# Patient Record
Sex: FEMALE | Race: WHITE | Employment: FULL TIME | URBAN - METROPOLITAN AREA
[De-identification: names, ages, dates, MRNs, and addresses within clinical notes are randomized per-mention and may not be internally consistent; named-entity substitution may affect disease eponyms.]

---

## 2020-03-04 ENCOUNTER — OFFICE VISIT (OUTPATIENT)
Dept: HEMATOLOGY/ONCOLOGY | Facility: HOSPITAL | Age: 40
End: 2020-03-04
Attending: THORACIC SURGERY (CARDIOTHORACIC VASCULAR SURGERY)
Payer: COMMERCIAL

## 2020-03-04 VITALS
DIASTOLIC BLOOD PRESSURE: 73 MMHG | OXYGEN SATURATION: 96 % | RESPIRATION RATE: 16 BRPM | SYSTOLIC BLOOD PRESSURE: 109 MMHG | WEIGHT: 175 LBS | TEMPERATURE: 98 F | HEART RATE: 77 BPM

## 2020-03-04 NOTE — CONSULTS
Thoracic Surgery Consult    Reason for Consultation: mediastinal mass    Chief Complaint: \"mass in my chest is growing\"    History of Present Illness: Patient is a 37year old, female with PMH HTN presents today for evaluation of mediastinal mass.     Kush Ayala in no acute distress  HEENT: Normocephalic  Neck: Supple, trachea midline, no JVD, no masses. Nodes: no cervical or supraclavicular lymphadenopathy appreciated  Heart: regular rate and rhythm. No murmurs, rubs or gallops. No lower extremity edema.   Lungs: of the mass. On personal review of the films, I think that this may be evolving lung consolidation. It should be reachable for biopsy via navigational bronchoscopy. I have consulted Dr. Nelia Cid of interventional pulmonology for this reason.   I w

## 2020-03-17 NOTE — H&P
[address] Patient Status:  Hospital Outpatient Surgery    1980 MRN [id number]   [facility] ENDOSCOPY Attending Navi Carlson MD   Hosp Day # 0 PCP No primary care provider on file.        Pt seen and exam

## 2020-03-25 RX ORDER — LABETALOL 100 MG/1
100 TABLET, FILM COATED ORAL 2 TIMES DAILY
COMMUNITY
End: 2021-11-04

## 2020-04-16 NOTE — PROGRESS NOTES
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 She was in the nursing home for 2 hours on 4/7 doing xrays there. She kept working til 4/11 and has been home since.  Has not been tested for

## 2020-04-16 NOTE — PAT NURSING NOTE
Spoke with patient to verify date change to 5/1/20  Patient states no changes in her medical history or medications

## 2020-04-17 ENCOUNTER — APPOINTMENT (OUTPATIENT)
Dept: CT IMAGING | Facility: HOSPITAL | Age: 40
End: 2020-04-17
Attending: INTERNAL MEDICINE
Payer: COMMERCIAL

## 2020-04-29 ENCOUNTER — TELEPHONE (OUTPATIENT)
Dept: FAMILY MEDICINE CLINIC | Facility: CLINIC | Age: 40
End: 2020-04-29

## 2020-04-29 ENCOUNTER — LAB ENCOUNTER (OUTPATIENT)
Dept: LAB | Facility: HOSPITAL | Age: 40
End: 2020-04-29
Attending: PHYSICIAN ASSISTANT
Payer: COMMERCIAL

## 2020-04-29 DIAGNOSIS — Z20.822 SUSPECTED 2019 NOVEL CORONAVIRUS INFECTION: Primary | ICD-10-CM

## 2020-04-29 DIAGNOSIS — Z20.822 SUSPECTED 2019 NOVEL CORONAVIRUS INFECTION: ICD-10-CM

## 2020-05-01 ENCOUNTER — HOSPITAL ENCOUNTER (OUTPATIENT)
Facility: HOSPITAL | Age: 40
Setting detail: HOSPITAL OUTPATIENT SURGERY
Discharge: HOME | End: 2020-05-01
Attending: INTERNAL MEDICINE | Admitting: INTERNAL MEDICINE
Payer: COMMERCIAL

## 2020-05-01 ENCOUNTER — APPOINTMENT (OUTPATIENT)
Dept: GENERAL RADIOLOGY | Facility: HOSPITAL | Age: 40
End: 2020-05-01
Attending: INTERNAL MEDICINE
Payer: COMMERCIAL

## 2020-05-01 ENCOUNTER — HOSPITAL ENCOUNTER (OUTPATIENT)
Dept: CT IMAGING | Facility: HOSPITAL | Age: 40
Discharge: HOME | End: 2020-05-01
Attending: INTERNAL MEDICINE | Admitting: INTERNAL MEDICINE
Payer: COMMERCIAL

## 2020-05-01 ENCOUNTER — ANESTHESIA (OUTPATIENT)
Dept: ENDOSCOPY | Facility: HOSPITAL | Age: 40
End: 2020-05-01
Payer: COMMERCIAL

## 2020-05-01 ENCOUNTER — ANESTHESIA EVENT (OUTPATIENT)
Dept: ENDOSCOPY | Facility: HOSPITAL | Age: 40
End: 2020-05-01
Payer: COMMERCIAL

## 2020-05-01 VITALS
DIASTOLIC BLOOD PRESSURE: 72 MMHG | BODY MASS INDEX: 27.8 KG/M2 | TEMPERATURE: 98 F | WEIGHT: 173 LBS | HEIGHT: 66 IN | OXYGEN SATURATION: 94 % | RESPIRATION RATE: 20 BRPM | HEART RATE: 88 BPM | SYSTOLIC BLOOD PRESSURE: 111 MMHG

## 2020-05-01 DIAGNOSIS — R91.8 LUNG MASS: ICD-10-CM

## 2020-05-01 DIAGNOSIS — J98.59 MEDIASTINAL MASS: Primary | ICD-10-CM

## 2020-05-01 PROCEDURE — 87205 SMEAR GRAM STAIN: CPT | Performed by: INTERNAL MEDICINE

## 2020-05-01 PROCEDURE — 0BBC8ZX EXCISION OF RIGHT UPPER LUNG LOBE, VIA NATURAL OR ARTIFICIAL OPENING ENDOSCOPIC, DIAGNOSTIC: ICD-10-PCS | Performed by: INTERNAL MEDICINE

## 2020-05-01 PROCEDURE — 0B9C8ZX DRAINAGE OF RIGHT UPPER LUNG LOBE, VIA NATURAL OR ARTIFICIAL OPENING ENDOSCOPIC, DIAGNOSTIC: ICD-10-PCS | Performed by: INTERNAL MEDICINE

## 2020-05-01 PROCEDURE — 07978ZX DRAINAGE OF THORAX LYMPHATIC, VIA NATURAL OR ARTIFICIAL OPENING ENDOSCOPIC APPROACH, DIAGNOSTIC: ICD-10-PCS | Performed by: INTERNAL MEDICINE

## 2020-05-01 PROCEDURE — 87305 ASPERGILLUS AG IA: CPT | Performed by: INTERNAL MEDICINE

## 2020-05-01 PROCEDURE — 88104 CYTOPATH FL NONGYN SMEARS: CPT | Performed by: INTERNAL MEDICINE

## 2020-05-01 PROCEDURE — 71250 CT THORAX DX C-: CPT | Performed by: INTERNAL MEDICINE

## 2020-05-01 PROCEDURE — 87102 FUNGUS ISOLATION CULTURE: CPT | Performed by: INTERNAL MEDICINE

## 2020-05-01 PROCEDURE — 88185 FLOWCYTOMETRY/TC ADD-ON: CPT | Performed by: INTERNAL MEDICINE

## 2020-05-01 PROCEDURE — 88305 TISSUE EXAM BY PATHOLOGIST: CPT | Performed by: INTERNAL MEDICINE

## 2020-05-01 PROCEDURE — 87206 SMEAR FLUORESCENT/ACID STAI: CPT | Performed by: INTERNAL MEDICINE

## 2020-05-01 PROCEDURE — 88173 CYTOPATH EVAL FNA REPORT: CPT | Performed by: INTERNAL MEDICINE

## 2020-05-01 PROCEDURE — 71045 X-RAY EXAM CHEST 1 VIEW: CPT | Performed by: INTERNAL MEDICINE

## 2020-05-01 PROCEDURE — 89051 BODY FLUID CELL COUNT: CPT | Performed by: INTERNAL MEDICINE

## 2020-05-01 PROCEDURE — 87116 MYCOBACTERIA CULTURE: CPT | Performed by: INTERNAL MEDICINE

## 2020-05-01 PROCEDURE — 87071 CULTURE AEROBIC QUANT OTHER: CPT | Performed by: INTERNAL MEDICINE

## 2020-05-01 PROCEDURE — 88172 CYTP DX EVAL FNA 1ST EA SITE: CPT | Performed by: INTERNAL MEDICINE

## 2020-05-01 PROCEDURE — 88184 FLOWCYTOMETRY/ TC 1 MARKER: CPT | Performed by: INTERNAL MEDICINE

## 2020-05-01 PROCEDURE — 8E0WXBZ COMPUTER ASSISTED PROCEDURE OF TRUNK REGION: ICD-10-PCS | Performed by: INTERNAL MEDICINE

## 2020-05-01 PROCEDURE — 89050 BODY FLUID CELL COUNT: CPT | Performed by: INTERNAL MEDICINE

## 2020-05-01 PROCEDURE — 81025 URINE PREGNANCY TEST: CPT | Performed by: INTERNAL MEDICINE

## 2020-05-01 RX ORDER — ROCURONIUM BROMIDE 10 MG/ML
INJECTION, SOLUTION INTRAVENOUS AS NEEDED
Status: DISCONTINUED | OUTPATIENT
Start: 2020-05-01 | End: 2020-05-01 | Stop reason: SURG

## 2020-05-01 RX ORDER — NALOXONE HYDROCHLORIDE 0.4 MG/ML
80 INJECTION, SOLUTION INTRAMUSCULAR; INTRAVENOUS; SUBCUTANEOUS AS NEEDED
Status: DISCONTINUED | OUTPATIENT
Start: 2020-05-01 | End: 2020-05-01 | Stop reason: HOSPADM

## 2020-05-01 RX ORDER — ONDANSETRON 2 MG/ML
INJECTION INTRAMUSCULAR; INTRAVENOUS AS NEEDED
Status: DISCONTINUED | OUTPATIENT
Start: 2020-05-01 | End: 2020-05-01 | Stop reason: SURG

## 2020-05-01 RX ORDER — LABETALOL HYDROCHLORIDE 5 MG/ML
5 INJECTION, SOLUTION INTRAVENOUS EVERY 5 MIN PRN
Status: DISCONTINUED | OUTPATIENT
Start: 2020-05-01 | End: 2020-05-01 | Stop reason: HOSPADM

## 2020-05-01 RX ORDER — SODIUM CHLORIDE, SODIUM LACTATE, POTASSIUM CHLORIDE, CALCIUM CHLORIDE 600; 310; 30; 20 MG/100ML; MG/100ML; MG/100ML; MG/100ML
INJECTION, SOLUTION INTRAVENOUS CONTINUOUS
Status: DISCONTINUED | OUTPATIENT
Start: 2020-05-01 | End: 2020-05-01

## 2020-05-01 RX ORDER — DEXAMETHASONE SODIUM PHOSPHATE 4 MG/ML
VIAL (ML) INJECTION AS NEEDED
Status: DISCONTINUED | OUTPATIENT
Start: 2020-05-01 | End: 2020-05-01 | Stop reason: SURG

## 2020-05-01 RX ORDER — SODIUM CHLORIDE 9 MG/ML
INJECTION, SOLUTION INTRAVENOUS CONTINUOUS
Status: CANCELLED | OUTPATIENT
Start: 2020-05-01

## 2020-05-01 RX ORDER — MIDAZOLAM HYDROCHLORIDE 1 MG/ML
1 INJECTION INTRAMUSCULAR; INTRAVENOUS EVERY 5 MIN PRN
Status: DISCONTINUED | OUTPATIENT
Start: 2020-05-01 | End: 2020-05-01 | Stop reason: HOSPADM

## 2020-05-01 RX ORDER — HYDROMORPHONE HYDROCHLORIDE 1 MG/ML
0.4 INJECTION, SOLUTION INTRAMUSCULAR; INTRAVENOUS; SUBCUTANEOUS EVERY 5 MIN PRN
Status: DISCONTINUED | OUTPATIENT
Start: 2020-05-01 | End: 2020-05-01 | Stop reason: HOSPADM

## 2020-05-01 RX ORDER — SODIUM CHLORIDE, SODIUM LACTATE, POTASSIUM CHLORIDE, CALCIUM CHLORIDE 600; 310; 30; 20 MG/100ML; MG/100ML; MG/100ML; MG/100ML
INJECTION, SOLUTION INTRAVENOUS CONTINUOUS
Status: DISCONTINUED | OUTPATIENT
Start: 2020-05-01 | End: 2020-05-01 | Stop reason: HOSPADM

## 2020-05-01 RX ORDER — ONDANSETRON 2 MG/ML
4 INJECTION INTRAMUSCULAR; INTRAVENOUS ONCE AS NEEDED
Status: DISCONTINUED | OUTPATIENT
Start: 2020-05-01 | End: 2020-05-01

## 2020-05-01 RX ORDER — LIDOCAINE HYDROCHLORIDE 10 MG/ML
INJECTION, SOLUTION EPIDURAL; INFILTRATION; INTRACAUDAL; PERINEURAL AS NEEDED
Status: DISCONTINUED | OUTPATIENT
Start: 2020-05-01 | End: 2020-05-01 | Stop reason: SURG

## 2020-05-01 RX ORDER — LIDOCAINE HYDROCHLORIDE 40 MG/ML
1 INJECTION, SOLUTION RETROBULBAR; TOPICAL ONCE
Status: DISCONTINUED | OUTPATIENT
Start: 2020-05-01 | End: 2020-05-01

## 2020-05-01 RX ORDER — ONDANSETRON 2 MG/ML
4 INJECTION INTRAMUSCULAR; INTRAVENOUS AS NEEDED
Status: DISCONTINUED | OUTPATIENT
Start: 2020-05-01 | End: 2020-05-01 | Stop reason: HOSPADM

## 2020-05-01 RX ORDER — MIDAZOLAM HYDROCHLORIDE 1 MG/ML
INJECTION INTRAMUSCULAR; INTRAVENOUS AS NEEDED
Status: DISCONTINUED | OUTPATIENT
Start: 2020-05-01 | End: 2020-05-01 | Stop reason: SURG

## 2020-05-01 RX ORDER — METOCLOPRAMIDE HYDROCHLORIDE 5 MG/ML
10 INJECTION INTRAMUSCULAR; INTRAVENOUS AS NEEDED
Status: DISCONTINUED | OUTPATIENT
Start: 2020-05-01 | End: 2020-05-01 | Stop reason: HOSPADM

## 2020-05-01 RX ORDER — SODIUM CHLORIDE 9 MG/ML
INJECTION, SOLUTION INTRAVENOUS CONTINUOUS
Status: DISCONTINUED | OUTPATIENT
Start: 2020-05-01 | End: 2020-05-01

## 2020-05-01 RX ADMIN — ROCURONIUM BROMIDE 10 MG: 10 INJECTION, SOLUTION INTRAVENOUS at 11:00:00

## 2020-05-01 RX ADMIN — ROCURONIUM BROMIDE 10 MG: 10 INJECTION, SOLUTION INTRAVENOUS at 11:21:00

## 2020-05-01 RX ADMIN — LIDOCAINE HYDROCHLORIDE 30 MG: 10 INJECTION, SOLUTION EPIDURAL; INFILTRATION; INTRACAUDAL; PERINEURAL at 10:52:00

## 2020-05-01 RX ADMIN — ONDANSETRON 4 MG: 2 INJECTION INTRAMUSCULAR; INTRAVENOUS at 11:29:00

## 2020-05-01 RX ADMIN — SODIUM CHLORIDE: 9 INJECTION, SOLUTION INTRAVENOUS at 12:28:00

## 2020-05-01 RX ADMIN — SODIUM CHLORIDE: 9 INJECTION, SOLUTION INTRAVENOUS at 10:47:00

## 2020-05-01 RX ADMIN — DEXAMETHASONE SODIUM PHOSPHATE 8 MG: 4 MG/ML VIAL (ML) INJECTION at 10:59:00

## 2020-05-01 RX ADMIN — MIDAZOLAM HYDROCHLORIDE 2 MG: 1 INJECTION INTRAMUSCULAR; INTRAVENOUS at 10:47:00

## 2020-05-01 NOTE — OPERATIVE REPORT
DATE OF PROCEDURE:  5/1/2020    PREOPERATIVE DIAGNOSIS(ES): right upper lobe (RUL) mass, mediastinal and hilar lymphadenopathy    POSTOPERATIVE DIAGNOSIS(ES): same    OPERATION(S) PERFORMED:  Navigational bronchoscopy with transbronchial biopsy, and bronch 13mm.  Virtual pathways were created to these lesions. This information was stored to a jump drive and loaded onto the [employer] in the bronchoscopy suite.     The patient was placed in a supine position, anesthesia administered, and patient was intub brushes through the bronchoscope, they would not advance, unfortunately. Bronchoalveolar lavage was performed, instilling 100cc with 30cc aspirated. There was no evidence of active bleeding. Bronchoscope was withdrawn and procedure was concluded.   Dionisio

## 2020-05-01 NOTE — ANESTHESIA PROCEDURE NOTES
Airway  Date/Time: 5/1/2020 10:52 AM  Urgency: elective    Airway not difficult    General Information and Staff    Patient location during procedure: OR  Anesthesiologist: Tia Nielsen MD  Performed: anesthesiologist     Indications and Patient Qasim

## 2020-05-01 NOTE — ANESTHESIA POSTPROCEDURE EVALUATION
[address] Patient Status:  Hospital Outpatient Surgery   Age/Gender 37year old female MRN [id number]   Location [facility]. Attending Dioni Stern MD   Hosp Day # 0 PCP No primary care provider on file.

## 2020-05-01 NOTE — ANESTHESIA PREPROCEDURE EVALUATION
PRE-OP EVALUATION    Patient Name: Denis Fuelling    Pre-op Diagnosis: RIGHT UPPER LOBE MASS    Procedure(s):  NAVIGATIONAL BRONCHOSCOPY WITH RADIAL PROBE ULTRASOUND, FLUOROSCOPY (Rob Lees), CYTOLOGY    Surgeon(s) and Role:     Muriel Foster MD - Pr findings            ASA: 2   Plan: general  NPO status verified and patient meets guidelines. Post-procedure pain management plan discussed with surgeon and patient.       Plan/risks discussed with: patient                Present on Admission:  **None**

## 2020-05-06 ENCOUNTER — TELEPHONE (OUTPATIENT)
Dept: HEMATOLOGY/ONCOLOGY | Facility: HOSPITAL | Age: 40
End: 2020-05-06

## 2020-05-06 DIAGNOSIS — R91.8 LUNG MASS: Primary | ICD-10-CM

## 2020-05-06 NOTE — TELEPHONE ENCOUNTER
Remberto-Bronch biopsy of lung mass was negative for cancer    Still with concern that there may be a carcinoid causing endobronchial obstruction    Will order DOTATATE scan. Called patient to discuss.

## 2020-05-07 DIAGNOSIS — R91.8 LUNG MASS: Primary | ICD-10-CM

## 2020-05-27 ENCOUNTER — HOSPITAL ENCOUNTER (OUTPATIENT)
Dept: NUCLEAR MEDICINE | Facility: HOSPITAL | Age: 40
Discharge: HOME | End: 2020-05-27
Attending: THORACIC SURGERY (CARDIOTHORACIC VASCULAR SURGERY)
Payer: COMMERCIAL

## 2020-05-27 ENCOUNTER — HOSPITAL ENCOUNTER (OUTPATIENT)
Dept: NUCLEAR MEDICINE | Facility: HOSPITAL | Age: 40
End: 2020-05-27
Attending: THORACIC SURGERY (CARDIOTHORACIC VASCULAR SURGERY)
Payer: COMMERCIAL

## 2020-05-27 DIAGNOSIS — R91.1 LUNG NODULE: Primary | ICD-10-CM

## 2020-05-27 DIAGNOSIS — R91.8 LUNG MASS: ICD-10-CM

## 2020-06-29 ENCOUNTER — HOSPITAL ENCOUNTER (OUTPATIENT)
Dept: NUCLEAR MEDICINE | Facility: HOSPITAL | Age: 40
Discharge: HOME | End: 2020-06-29
Attending: THORACIC SURGERY (CARDIOTHORACIC VASCULAR SURGERY)
Payer: COMMERCIAL

## 2020-06-29 DIAGNOSIS — R91.1 LUNG NODULE: ICD-10-CM

## 2020-06-29 PROCEDURE — 78815 PET IMAGE W/CT SKULL-THIGH: CPT | Performed by: THORACIC SURGERY (CARDIOTHORACIC VASCULAR SURGERY)

## 2020-07-22 ENCOUNTER — OFFICE VISIT (OUTPATIENT)
Dept: HEMATOLOGY/ONCOLOGY | Facility: HOSPITAL | Age: 40
End: 2020-07-22
Attending: THORACIC SURGERY (CARDIOTHORACIC VASCULAR SURGERY)
Payer: COMMERCIAL

## 2020-07-22 VITALS
RESPIRATION RATE: 16 BRPM | WEIGHT: 172.38 LBS | HEART RATE: 88 BPM | OXYGEN SATURATION: 98 % | BODY MASS INDEX: 28 KG/M2 | SYSTOLIC BLOOD PRESSURE: 125 MMHG | DIASTOLIC BLOOD PRESSURE: 80 MMHG

## 2020-07-22 PROCEDURE — 99211 OFF/OP EST MAY X REQ PHY/QHP: CPT

## 2020-07-22 NOTE — PROGRESS NOTES
Thoracic Surgery Consult      Name: Jazmin Shah   Age: 41year old   Sex: female. MRN: [id number]    Reason for Consultation: mediastinal mass/right upper lobe consolidation      Subjective:      Chief Complaint: \" returning after dotatate scan. Smoking status: Never Smoker      Smokeless tobacco: Never Used    Alcohol use: Yes      Frequency: Monthly or less      Drinks per session: 1 or 2      Binge frequency: Never      Family History:  No family history on file.       Review of Systems:  A 10 right upper lobe medially may represent primary malignancy.        2. Small lymph nodes within the anterior mediastinum and precarinal space with slightly elevated radiotracer uptake consistent with regional spread     Assessment/Plan:      Ms. Maki Found is a

## 2021-01-07 ENCOUNTER — APPOINTMENT (OUTPATIENT)
Dept: HEMATOLOGY/ONCOLOGY | Facility: HOSPITAL | Age: 41
End: 2021-01-07
Attending: THORACIC SURGERY (CARDIOTHORACIC VASCULAR SURGERY)
Payer: COMMERCIAL

## 2021-01-20 ENCOUNTER — OFFICE VISIT (OUTPATIENT)
Dept: HEMATOLOGY/ONCOLOGY | Facility: HOSPITAL | Age: 41
End: 2021-01-20
Attending: THORACIC SURGERY (CARDIOTHORACIC VASCULAR SURGERY)
Payer: COMMERCIAL

## 2021-01-20 VITALS
BODY MASS INDEX: 27 KG/M2 | TEMPERATURE: 98 F | HEART RATE: 77 BPM | RESPIRATION RATE: 16 BRPM | DIASTOLIC BLOOD PRESSURE: 64 MMHG | OXYGEN SATURATION: 98 % | WEIGHT: 168.38 LBS | SYSTOLIC BLOOD PRESSURE: 101 MMHG

## 2021-01-20 DIAGNOSIS — R91.8 MASS OF RIGHT LUNG: Primary | ICD-10-CM

## 2021-01-20 PROCEDURE — 99211 OFF/OP EST MAY X REQ PHY/QHP: CPT

## 2021-01-20 NOTE — PROGRESS NOTES
Thoracic Surgery Clinic Note     Name: Rachel Lang   Age: 41year old   Sex: female. MRN: [id number]    Subjective:     Chief Complaint: follow up with CT for lung/mediastinal mass   Ms. Nicole Balderas is a 41year old female who presents today in follow appreciated  Heart: regular rate and rhythm. No murmurs, rubs or gallops. No lower extremity edema. Lungs: Normal respiratory effort. Clear to ascultation bilaterally.  No audible wheezing or rhonchi  Chest: no TTP along sternum   Abdomen: Soft, Non-tender resection. Ms. Kieran Castro is a 41year old female with an anterior mediastinal mass vs. anterior lung consolidation. Despite multiple biopsies and diagnostic imaging, no answers to what this represents have been forthcoming.   She had a dotatate-PET/CT sca

## 2021-02-24 ENCOUNTER — OFFICE VISIT (OUTPATIENT)
Dept: HEMATOLOGY/ONCOLOGY | Facility: HOSPITAL | Age: 41
End: 2021-02-24
Attending: THORACIC SURGERY (CARDIOTHORACIC VASCULAR SURGERY)
Payer: COMMERCIAL

## 2021-02-24 VITALS
HEIGHT: 65.98 IN | DIASTOLIC BLOOD PRESSURE: 72 MMHG | BODY MASS INDEX: 27.16 KG/M2 | RESPIRATION RATE: 16 BRPM | TEMPERATURE: 98 F | WEIGHT: 169 LBS | SYSTOLIC BLOOD PRESSURE: 121 MMHG | OXYGEN SATURATION: 95 % | HEART RATE: 85 BPM

## 2021-02-24 DIAGNOSIS — R91.8 LUNG MASS: Primary | ICD-10-CM

## 2021-03-04 PROBLEM — J18.1 CONSOLIDATION OF RIGHT LOWER LOBE OF LUNG (HCC): Status: ACTIVE | Noted: 2021-03-04

## 2021-03-04 NOTE — PROGRESS NOTES
Ms. Chris Watts is a 41year old female who presents today in follow up to further discuss surgery. She Is doing well. She has no new complaints. Still complains of a vague, anterior chest pain/soreness. Mild to moderate.       PMH:  Past Medical History:   Jennifer Robledo additional surveillance vs. surgical biopsy, Ms. Terrell Khan initially elected surveillance. A follow up CT scan showed that the consolidated area had not improved and may have been a bit larger. On her last visit, I once again presented Ms. Martins with her op

## 2021-03-08 ENCOUNTER — LAB ENCOUNTER (OUTPATIENT)
Dept: LAB | Facility: HOSPITAL | Age: 41
End: 2021-03-08
Attending: THORACIC SURGERY (CARDIOTHORACIC VASCULAR SURGERY)
Payer: COMMERCIAL

## 2021-03-08 DIAGNOSIS — J98.59 MEDIASTINAL MASS: ICD-10-CM

## 2021-03-08 DIAGNOSIS — R91.8 LUNG MASS: ICD-10-CM

## 2021-03-08 DIAGNOSIS — R91.8 MASS OF UPPER LOBE OF RIGHT LUNG: ICD-10-CM

## 2021-03-08 DIAGNOSIS — Z01.818 PREOP TESTING: ICD-10-CM

## 2021-03-08 DIAGNOSIS — R91.8 MASS OF RIGHT LUNG: ICD-10-CM

## 2021-03-08 PROCEDURE — 86850 RBC ANTIBODY SCREEN: CPT

## 2021-03-08 PROCEDURE — 86901 BLOOD TYPING SEROLOGIC RH(D): CPT

## 2021-03-08 PROCEDURE — 86900 BLOOD TYPING SEROLOGIC ABO: CPT

## 2021-03-08 PROCEDURE — 85027 COMPLETE CBC AUTOMATED: CPT

## 2021-03-08 PROCEDURE — 36415 COLL VENOUS BLD VENIPUNCTURE: CPT

## 2021-03-08 PROCEDURE — 93005 ELECTROCARDIOGRAM TRACING: CPT

## 2021-03-08 PROCEDURE — 93010 ELECTROCARDIOGRAM REPORT: CPT | Performed by: INTERNAL MEDICINE

## 2021-03-08 PROCEDURE — 80048 BASIC METABOLIC PNL TOTAL CA: CPT

## 2021-03-08 PROCEDURE — 84702 CHORIONIC GONADOTROPIN TEST: CPT

## 2021-03-09 ENCOUNTER — ANESTHESIA EVENT (OUTPATIENT)
Dept: CARDIAC SURGERY | Facility: HOSPITAL | Age: 41
DRG: 168 | End: 2021-03-09
Payer: COMMERCIAL

## 2021-03-10 ENCOUNTER — HOSPITAL ENCOUNTER (INPATIENT)
Facility: HOSPITAL | Age: 41
LOS: 2 days | Discharge: HOME | DRG: 168 | End: 2021-03-12
Attending: THORACIC SURGERY (CARDIOTHORACIC VASCULAR SURGERY) | Admitting: THORACIC SURGERY (CARDIOTHORACIC VASCULAR SURGERY)
Payer: COMMERCIAL

## 2021-03-10 ENCOUNTER — ANESTHESIA (OUTPATIENT)
Dept: CARDIAC SURGERY | Facility: HOSPITAL | Age: 41
DRG: 168 | End: 2021-03-10
Payer: COMMERCIAL

## 2021-03-10 DIAGNOSIS — J98.59 MEDIASTINAL MASS: ICD-10-CM

## 2021-03-10 DIAGNOSIS — R91.8 MASS OF UPPER LOBE OF RIGHT LUNG: ICD-10-CM

## 2021-03-10 DIAGNOSIS — Z01.818 PREOP TESTING: Primary | ICD-10-CM

## 2021-03-10 PROBLEM — I10 ESSENTIAL HYPERTENSION: Chronic | Status: ACTIVE | Noted: 2021-03-10

## 2021-03-10 PROCEDURE — 0BBC4ZX EXCISION OF RIGHT UPPER LUNG LOBE, PERCUTANEOUS ENDOSCOPIC APPROACH, DIAGNOSTIC: ICD-10-PCS | Performed by: THORACIC SURGERY (CARDIOTHORACIC VASCULAR SURGERY)

## 2021-03-10 PROCEDURE — 3E0T3BZ INTRODUCTION OF ANESTHETIC AGENT INTO PERIPHERAL NERVES AND PLEXI, PERCUTANEOUS APPROACH: ICD-10-PCS | Performed by: ANESTHESIOLOGY

## 2021-03-10 PROCEDURE — 0WBC4ZX EXCISION OF MEDIASTINUM, PERCUTANEOUS ENDOSCOPIC APPROACH, DIAGNOSTIC: ICD-10-PCS | Performed by: THORACIC SURGERY (CARDIOTHORACIC VASCULAR SURGERY)

## 2021-03-10 PROCEDURE — 0WB84ZX EXCISION OF CHEST WALL, PERCUTANEOUS ENDOSCOPIC APPROACH, DIAGNOSTIC: ICD-10-PCS | Performed by: THORACIC SURGERY (CARDIOTHORACIC VASCULAR SURGERY)

## 2021-03-10 PROCEDURE — 99232 SBSQ HOSP IP/OBS MODERATE 35: CPT | Performed by: INTERNAL MEDICINE

## 2021-03-10 PROCEDURE — 0BJ08ZZ INSPECTION OF TRACHEOBRONCHIAL TREE, VIA NATURAL OR ARTIFICIAL OPENING ENDOSCOPIC: ICD-10-PCS | Performed by: THORACIC SURGERY (CARDIOTHORACIC VASCULAR SURGERY)

## 2021-03-10 RX ORDER — BISACODYL 10 MG
10 SUPPOSITORY, RECTAL RECTAL
Status: DISCONTINUED | OUTPATIENT
Start: 2021-03-10 | End: 2021-03-12

## 2021-03-10 RX ORDER — CEFAZOLIN SODIUM/WATER 2 G/20 ML
SYRINGE (ML) INTRAVENOUS AS NEEDED
Status: DISCONTINUED | OUTPATIENT
Start: 2021-03-10 | End: 2021-03-10 | Stop reason: SURG

## 2021-03-10 RX ORDER — ROCURONIUM BROMIDE 10 MG/ML
INJECTION, SOLUTION INTRAVENOUS AS NEEDED
Status: DISCONTINUED | OUTPATIENT
Start: 2021-03-10 | End: 2021-03-10 | Stop reason: SURG

## 2021-03-10 RX ORDER — DOCUSATE SODIUM 100 MG/1
100 CAPSULE, LIQUID FILLED ORAL 2 TIMES DAILY PRN
Status: DISCONTINUED | OUTPATIENT
Start: 2021-03-10 | End: 2021-03-12

## 2021-03-10 RX ORDER — ONDANSETRON 2 MG/ML
4 INJECTION INTRAMUSCULAR; INTRAVENOUS ONCE AS NEEDED
Status: ACTIVE | OUTPATIENT
Start: 2021-03-10 | End: 2021-03-10

## 2021-03-10 RX ORDER — ONDANSETRON 2 MG/ML
4 INJECTION INTRAMUSCULAR; INTRAVENOUS EVERY 6 HOURS PRN
Status: DISCONTINUED | OUTPATIENT
Start: 2021-03-10 | End: 2021-03-12

## 2021-03-10 RX ORDER — DEXAMETHASONE SODIUM PHOSPHATE 4 MG/ML
VIAL (ML) INJECTION AS NEEDED
Status: DISCONTINUED | OUTPATIENT
Start: 2021-03-10 | End: 2021-03-10 | Stop reason: SURG

## 2021-03-10 RX ORDER — POLYETHYLENE GLYCOL 3350 17 G/17G
17 POWDER, FOR SOLUTION ORAL DAILY
Status: DISCONTINUED | OUTPATIENT
Start: 2021-03-10 | End: 2021-03-12

## 2021-03-10 RX ORDER — LABETALOL HYDROCHLORIDE 5 MG/ML
5 INJECTION, SOLUTION INTRAVENOUS EVERY 5 MIN PRN
Status: ACTIVE | OUTPATIENT
Start: 2021-03-10 | End: 2021-03-10

## 2021-03-10 RX ORDER — MORPHINE SULFATE 4 MG/ML
4 INJECTION, SOLUTION INTRAMUSCULAR; INTRAVENOUS EVERY 4 HOURS PRN
Status: DISCONTINUED | OUTPATIENT
Start: 2021-03-10 | End: 2021-03-12

## 2021-03-10 RX ORDER — CALCIUM CARBONATE 200(500)MG
1000 TABLET,CHEWABLE ORAL 3 TIMES DAILY PRN
Status: DISCONTINUED | OUTPATIENT
Start: 2021-03-10 | End: 2021-03-12

## 2021-03-10 RX ORDER — MEPERIDINE HYDROCHLORIDE 25 MG/ML
12.5 INJECTION INTRAMUSCULAR; INTRAVENOUS; SUBCUTANEOUS AS NEEDED
Status: DISCONTINUED | OUTPATIENT
Start: 2021-03-10 | End: 2021-03-12 | Stop reason: HOSPADM

## 2021-03-10 RX ORDER — HYDROMORPHONE HYDROCHLORIDE 1 MG/ML
INJECTION, SOLUTION INTRAMUSCULAR; INTRAVENOUS; SUBCUTANEOUS
Status: COMPLETED
Start: 2021-03-10 | End: 2021-03-10

## 2021-03-10 RX ORDER — HYDROCODONE BITARTRATE AND ACETAMINOPHEN 5; 325 MG/1; MG/1
2 TABLET ORAL AS NEEDED
Status: DISCONTINUED | OUTPATIENT
Start: 2021-03-10 | End: 2021-03-12

## 2021-03-10 RX ORDER — ONDANSETRON 2 MG/ML
INJECTION INTRAMUSCULAR; INTRAVENOUS AS NEEDED
Status: DISCONTINUED | OUTPATIENT
Start: 2021-03-10 | End: 2021-03-10 | Stop reason: SURG

## 2021-03-10 RX ORDER — SODIUM CHLORIDE 0.9 % (FLUSH) 0.9 %
10 SYRINGE (ML) INJECTION AS NEEDED
Status: DISCONTINUED | OUTPATIENT
Start: 2021-03-10 | End: 2021-03-12

## 2021-03-10 RX ORDER — SODIUM CHLORIDE 9 MG/ML
INJECTION, SOLUTION INTRAVENOUS CONTINUOUS
Status: DISCONTINUED | OUTPATIENT
Start: 2021-03-10 | End: 2021-03-11

## 2021-03-10 RX ORDER — ONDANSETRON 2 MG/ML
4 INJECTION INTRAMUSCULAR; INTRAVENOUS AS NEEDED
Status: ACTIVE | OUTPATIENT
Start: 2021-03-10 | End: 2021-03-10

## 2021-03-10 RX ORDER — MIDAZOLAM HYDROCHLORIDE 1 MG/ML
INJECTION INTRAMUSCULAR; INTRAVENOUS AS NEEDED
Status: DISCONTINUED | OUTPATIENT
Start: 2021-03-10 | End: 2021-03-10 | Stop reason: SURG

## 2021-03-10 RX ORDER — IPRATROPIUM BROMIDE AND ALBUTEROL SULFATE 2.5; .5 MG/3ML; MG/3ML
3 SOLUTION RESPIRATORY (INHALATION) EVERY 4 HOURS PRN
Status: DISCONTINUED | OUTPATIENT
Start: 2021-03-10 | End: 2021-03-12

## 2021-03-10 RX ORDER — LABETALOL 100 MG/1
100 TABLET, FILM COATED ORAL 2 TIMES DAILY
Status: DISCONTINUED | OUTPATIENT
Start: 2021-03-11 | End: 2021-03-12

## 2021-03-10 RX ORDER — DIPHENHYDRAMINE HYDROCHLORIDE 50 MG/ML
12.5 INJECTION INTRAMUSCULAR; INTRAVENOUS AS NEEDED
Status: ACTIVE | OUTPATIENT
Start: 2021-03-10 | End: 2021-03-10

## 2021-03-10 RX ORDER — OXYCODONE HYDROCHLORIDE 5 MG/1
10 TABLET ORAL EVERY 4 HOURS PRN
Status: DISCONTINUED | OUTPATIENT
Start: 2021-03-10 | End: 2021-03-12

## 2021-03-10 RX ORDER — HYDROCODONE BITARTRATE AND ACETAMINOPHEN 5; 325 MG/1; MG/1
1 TABLET ORAL AS NEEDED
Status: DISCONTINUED | OUTPATIENT
Start: 2021-03-10 | End: 2021-03-12

## 2021-03-10 RX ORDER — SODIUM PHOSPHATE, DIBASIC AND SODIUM PHOSPHATE, MONOBASIC 7; 19 G/133ML; G/133ML
1 ENEMA RECTAL ONCE AS NEEDED
Status: DISCONTINUED | OUTPATIENT
Start: 2021-03-10 | End: 2021-03-12

## 2021-03-10 RX ORDER — CEFAZOLIN SODIUM/WATER 2 G/20 ML
2 SYRINGE (ML) INTRAVENOUS EVERY 8 HOURS
Status: COMPLETED | OUTPATIENT
Start: 2021-03-10 | End: 2021-03-11

## 2021-03-10 RX ORDER — MORPHINE SULFATE 4 MG/ML
6 INJECTION, SOLUTION INTRAMUSCULAR; INTRAVENOUS EVERY 4 HOURS PRN
Status: DISCONTINUED | OUTPATIENT
Start: 2021-03-10 | End: 2021-03-12

## 2021-03-10 RX ORDER — KETOROLAC TROMETHAMINE 30 MG/ML
INJECTION, SOLUTION INTRAMUSCULAR; INTRAVENOUS AS NEEDED
Status: DISCONTINUED | OUTPATIENT
Start: 2021-03-10 | End: 2021-03-10 | Stop reason: SURG

## 2021-03-10 RX ORDER — ENOXAPARIN SODIUM 100 MG/ML
40 INJECTION SUBCUTANEOUS DAILY
Status: DISCONTINUED | OUTPATIENT
Start: 2021-03-10 | End: 2021-03-12

## 2021-03-10 RX ORDER — SODIUM CHLORIDE, SODIUM LACTATE, POTASSIUM CHLORIDE, CALCIUM CHLORIDE 600; 310; 30; 20 MG/100ML; MG/100ML; MG/100ML; MG/100ML
INJECTION, SOLUTION INTRAVENOUS CONTINUOUS
Status: DISCONTINUED | OUTPATIENT
Start: 2021-03-10 | End: 2021-03-11

## 2021-03-10 RX ORDER — MIDAZOLAM HYDROCHLORIDE 1 MG/ML
1 INJECTION INTRAMUSCULAR; INTRAVENOUS EVERY 5 MIN PRN
Status: ACTIVE | OUTPATIENT
Start: 2021-03-10 | End: 2021-03-10

## 2021-03-10 RX ORDER — ACETAMINOPHEN 10 MG/ML
1000 INJECTION, SOLUTION INTRAVENOUS EVERY 6 HOURS
Status: COMPLETED | OUTPATIENT
Start: 2021-03-10 | End: 2021-03-12

## 2021-03-10 RX ORDER — HYDROMORPHONE HYDROCHLORIDE 1 MG/ML
0.4 INJECTION, SOLUTION INTRAMUSCULAR; INTRAVENOUS; SUBCUTANEOUS EVERY 5 MIN PRN
Status: ACTIVE | OUTPATIENT
Start: 2021-03-10 | End: 2021-03-10

## 2021-03-10 RX ORDER — MORPHINE SULFATE 2 MG/ML
2 INJECTION, SOLUTION INTRAMUSCULAR; INTRAVENOUS EVERY 4 HOURS PRN
Status: DISCONTINUED | OUTPATIENT
Start: 2021-03-10 | End: 2021-03-12

## 2021-03-10 RX ORDER — NALOXONE HYDROCHLORIDE 0.4 MG/ML
80 INJECTION, SOLUTION INTRAMUSCULAR; INTRAVENOUS; SUBCUTANEOUS AS NEEDED
Status: ACTIVE | OUTPATIENT
Start: 2021-03-10 | End: 2021-03-10

## 2021-03-10 RX ORDER — OXYCODONE HYDROCHLORIDE 5 MG/1
5 TABLET ORAL EVERY 4 HOURS PRN
Status: DISCONTINUED | OUTPATIENT
Start: 2021-03-10 | End: 2021-03-12

## 2021-03-10 RX ORDER — METOCLOPRAMIDE HYDROCHLORIDE 5 MG/ML
INJECTION INTRAMUSCULAR; INTRAVENOUS AS NEEDED
Status: DISCONTINUED | OUTPATIENT
Start: 2021-03-10 | End: 2021-03-10 | Stop reason: SURG

## 2021-03-10 RX ORDER — KETOROLAC TROMETHAMINE 15 MG/ML
15 INJECTION, SOLUTION INTRAMUSCULAR; INTRAVENOUS EVERY 6 HOURS
Status: DISCONTINUED | OUTPATIENT
Start: 2021-03-10 | End: 2021-03-12

## 2021-03-10 RX ORDER — BUPIVACAINE HYDROCHLORIDE 2.5 MG/ML
INJECTION, SOLUTION EPIDURAL; INFILTRATION; INTRACAUDAL AS NEEDED
Status: DISCONTINUED | OUTPATIENT
Start: 2021-03-10 | End: 2021-03-10 | Stop reason: HOSPADM

## 2021-03-10 RX ORDER — SODIUM CHLORIDE, SODIUM LACTATE, POTASSIUM CHLORIDE, CALCIUM CHLORIDE 600; 310; 30; 20 MG/100ML; MG/100ML; MG/100ML; MG/100ML
INJECTION, SOLUTION INTRAVENOUS CONTINUOUS PRN
Status: DISCONTINUED | OUTPATIENT
Start: 2021-03-10 | End: 2021-03-10 | Stop reason: SURG

## 2021-03-10 RX ADMIN — MIDAZOLAM HYDROCHLORIDE 2 MG: 1 INJECTION INTRAMUSCULAR; INTRAVENOUS at 07:30:00

## 2021-03-10 RX ADMIN — ONDANSETRON 4 MG: 2 INJECTION INTRAMUSCULAR; INTRAVENOUS at 09:18:00

## 2021-03-10 RX ADMIN — KETOROLAC TROMETHAMINE 30 MG: 30 INJECTION, SOLUTION INTRAMUSCULAR; INTRAVENOUS at 09:18:00

## 2021-03-10 RX ADMIN — SODIUM CHLORIDE, SODIUM LACTATE, POTASSIUM CHLORIDE, CALCIUM CHLORIDE: 600; 310; 30; 20 INJECTION, SOLUTION INTRAVENOUS at 09:48:00

## 2021-03-10 RX ADMIN — SODIUM CHLORIDE, SODIUM LACTATE, POTASSIUM CHLORIDE, CALCIUM CHLORIDE: 600; 310; 30; 20 INJECTION, SOLUTION INTRAVENOUS at 07:26:00

## 2021-03-10 RX ADMIN — METOCLOPRAMIDE HYDROCHLORIDE 10 MG: 5 INJECTION INTRAMUSCULAR; INTRAVENOUS at 07:55:00

## 2021-03-10 RX ADMIN — CEFAZOLIN SODIUM/WATER 2 G: 2 G/20 ML SYRINGE (ML) INTRAVENOUS at 07:46:00

## 2021-03-10 RX ADMIN — ROCURONIUM BROMIDE 70 MG: 10 INJECTION, SOLUTION INTRAVENOUS at 07:33:00

## 2021-03-10 RX ADMIN — DEXAMETHASONE SODIUM PHOSPHATE 8 MG: 4 MG/ML VIAL (ML) INJECTION at 07:55:00

## 2021-03-10 NOTE — ANESTHESIA PREPROCEDURE EVALUATION
PRE-OP EVALUATION    Patient Name: Selam Quiñones    Pre-op Diagnosis: mediastinal and right upper lobe mass    Procedure(s):  flexible bronchoscopy, right video assisted thoracoscopic upper lobectomy, mediastinal mass resection, possible thoracotomy 03/08/2021     03/08/2021    CO2 28.0 03/08/2021    BUN 9 03/08/2021    CREATSERUM 0.49 (L) 03/08/2021     (H) 03/08/2021    CA 9.2 03/08/2021            Airway      Mallampati: II  Mouth opening: >3 FB  TM distance: 4 - 6 cm  Neck ROM: full C

## 2021-03-10 NOTE — H&P
History & Physical Examination    Patient Name: Eve Hassan  MRN: [id number]  [facility]: [id number]  YOB: 1980    Diagnosis: lung/mediastinal mass    Present Illness: Ms. Alessandra Moran is a 41year old female who presents today for surgical biopsy/re

## 2021-03-10 NOTE — ANESTHESIA PROCEDURE NOTES
Peripheral IV  Inserted by: Isi Travis MD    Placement  Needle size: 18 G  Laterality: right  Location: forearm  Local anesthetic: none  Site prep: alcohol  Technique: anatomical landmarks  Attempts: 1

## 2021-03-10 NOTE — ANESTHESIA PROCEDURE NOTES
Airway  Urgency: elective      General Information and Staff    Patient location during procedure: OR  Anesthesiologist: Isi Travis MD  Performed: anesthesiologist     Indications and Patient Condition  Indications for airway management: anesthesia  Sed

## 2021-03-10 NOTE — OPERATIVE REPORT
[facility]    PATIENT'S NAME: Mary Alireza   ATTENDING PHYSICIAN: Dayna Ndiaye. Diaz Kaur MD   OPERATING PHYSICIAN: Dayna Ndiaye.  Diaz Kaur MD   PATIENT ACCOUNT#:   [id number]    LOCATION:  [address] [place], [place] [address] [facility]  MEDICAL RECORD #:   [id number]       DATE endotracheal anesthesia. I performed a flexible bronchoscopy. The trachea, mainstem bronchi, lobar and segmental bronchi were examined bilaterally. This was a normal bronchoscopy. No endobronchial lesions were seen.   She was then placed in left lateral on the anterior chest wall and thus I did a biopsy of this area using the Harmonic scalpel. Once this was done, I inspected for any bleeding.   Areas where there was a small amount of oozing were touched with electrocautery and Surgicel was placed for nico

## 2021-03-10 NOTE — BRIEF OP NOTE
Pre-Operative Diagnosis: mediastinal and right upper lobe mass     Post-Operative Diagnosis: same     Procedure Performed:   Procedure(s):  flexible bronchoscopy, right video assisted thoracoscopic; multiple biopsy of right lung and medistinal mass    Surg

## 2021-03-10 NOTE — ANESTHESIA PROCEDURE NOTES
Arterial Line  Performed by: Lynn Mcgowan MD  Authorized by: Lynn Mcgowan MD     General Information and Staff    Procedure Start:   Anesthesiologist: Lynn Mcgowan MD  Performed By:  Anesthesiologist  Patient Location:  OR  Indication: continuous bloo

## 2021-03-10 NOTE — CONSULTS
[facility]  Report of Consultation    [address] [facility] Patient Status:  Inpatient    1980 MRN [id number]   Location [place] [address] Attending Karol Bray, Ning Gonzalez MD   Hosp Day # 0 PCP No primary care provider on file.      Reason for C PRN  •  Atropine Sulfate 0.1 MG/ML injection 0.5 mg, 0.5 mg, Intravenous, PRN  •  Labetalol HCl (TRANDATE) injection 5 mg, 5 mg, Intravenous, Q5 Min PRN  •  Naloxone HCl (NARCAN) 0.4 MG/ML injection 80 mcg, 80 mcg, Intravenous, PRN  •  Midazolam HCl (VERSE Intravenous, Q4H PRN **OR** morphINE sulfate (PF) 4 MG/ML injection 4 mg, 4 mg, Intravenous, Q4H PRN **OR** morphINE sulfate (PF) 4 MG/ML injection 6 mg, 6 mg, Intravenous, Q4H PRN  •  ondansetron HCl (ZOFRAN) injection 4 mg, 4 mg, Intravenous, Q6H PRN  • extremities. No swelling noted. Integument: Right lateral chest wall incision in dressing  Psychiatric: Appropriate mood and affect. Laboratory Data:    Will order CBC and BMP for tomorrow            ASSESSMENT / PLAN:   Status post flexible bronchosco

## 2021-03-10 NOTE — CONSULTS
Kristian Adams [address] [facility]/[place] [address] Note [facility]  Report of Consultation    Saul Rodriguez Patient Status:  Inpatient    1980 MRN [id number]   [facility] 6NE-A Attending Thierno Sparks HYDROcodone-acetaminophen, Atropine Sulfate, Labetalol HCl, Naloxone HCl, Midazolam HCl, Meperidine HCl, ondansetron HCl, Trimethobenzamide HCl, HYDROmorphone HCl, fentaNYL citrate, Atropine Sulfate, Naloxone HCl, HYDROmorphone HCl, fentaNYL citrate, Labet Temp src Pulse Resp SpO2   03/10/21 0948 130/74 97.8 °F (36.6 °C) — 74 20 99 %   03/10/21 0639 123/61 97.8 °F (36.6 °C) Temporal 85 14 97 %       Intake/Output:    Intake/Output Summary (Last 24 hours) at 3/10/2021 1203  Last data filed at 3/10/2021 [address], [address], [address], [address], NITRITE, LEUUR, WBCUR, RBCUR, BACUR, EPIUR in the last 168 hours.     Radiology:   Previous CT and PET/CT imaging reviewed which shows RUL nodule/lesion with FDG uptake to 3.6 and mediastinal nodes with SUV 3.2

## 2021-03-10 NOTE — ANESTHESIA POSTPROCEDURE EVALUATION
[address] Patient Status:  Inpatient   Age/Gender 41year old female MRN [id number]   Location [address] [facility] [address] Attending Jeana Masters., Rubi Mario MD   Hosp Day # 0 PCP No primary care provider on file.        Anesthesia Post-op

## 2021-03-11 ENCOUNTER — APPOINTMENT (OUTPATIENT)
Dept: GENERAL RADIOLOGY | Facility: HOSPITAL | Age: 41
DRG: 168 | End: 2021-03-11
Attending: INTERNAL MEDICINE
Payer: COMMERCIAL

## 2021-03-11 PROCEDURE — 71045 X-RAY EXAM CHEST 1 VIEW: CPT | Performed by: INTERNAL MEDICINE

## 2021-03-11 PROCEDURE — 99232 SBSQ HOSP IP/OBS MODERATE 35: CPT | Performed by: INTERNAL MEDICINE

## 2021-03-11 RX ORDER — ACETAMINOPHEN 325 MG/1
650 TABLET ORAL EVERY 4 HOURS PRN
Qty: 90 TABLET | Refills: 0 | Status: SHIPPED | OUTPATIENT
Start: 2021-03-11 | End: 2021-11-04

## 2021-03-11 RX ORDER — CYCLOBENZAPRINE HCL 10 MG
10 TABLET ORAL 3 TIMES DAILY PRN
Status: DISCONTINUED | OUTPATIENT
Start: 2021-03-11 | End: 2021-03-12

## 2021-03-11 RX ORDER — OXYCODONE HYDROCHLORIDE 5 MG/1
5 TABLET ORAL EVERY 4 HOURS PRN
Qty: 40 TABLET | Refills: 0 | Status: SHIPPED | OUTPATIENT
Start: 2021-03-11 | End: 2021-11-04

## 2021-03-11 RX ORDER — POLYETHYLENE GLYCOL 3350 17 G/17G
17 POWDER, FOR SOLUTION ORAL DAILY PRN
Qty: 30 EACH | Refills: 0 | Status: SHIPPED | OUTPATIENT
Start: 2021-03-11 | End: 2021-11-04

## 2021-03-11 NOTE — PHYSICAL THERAPY NOTE
PHYSICAL THERAPY EVALUATION - INPATIENT     Room Number: 9547/9413-D  Evaluation Date: 3/11/2021  Type of Evaluation: Initial  Physician Order: PT Eval and Treat    Presenting Problem: 3/10 - s/p bronchoscopy, lung mass resection   Reason for Therapy mechanics;Repositioning    COGNITION  · Overall Cognitive Status:  WFL - within functional limits    RANGE OF MOTION AND STRENGTH ASSESSMENT  Upper extremity ROM and strength -see OT evaluation    Lower extremity ROM is within functional limits     Lower e ankle pumps following procedure. Pt instructed to walk w/ staff 3x a day, pt in agreement w/ plan.       Exercise/Education Provided:  Bed mobility  Functional activity tolerated  Gait training  Transfer training    Patient End of Session: Up in chair;Need

## 2021-03-11 NOTE — PROGRESS NOTES
[facility] Pulmonary/Critical Care Progress Note     SUBJECTIVE/Interval history: All events, procedures, notes reviewed. Pain/anxiety overnight, now much better controlled. Chest tube removed earlier today.  She denies 0.49* 0.64   GFRAA 139 127   GFRNAA 121 110   CA 9.2 9.1    135*   K 4.1 3.8    106   CO2 28.0 25.0     Recent Labs   Lab 03/08/21  0935 03/11/21  0434   RBC 4.25 3.69*   HGB 10.0* 8.7*   HCT 32.5* 27.9*   MCV 76.5* 75.6*   MCH 23.5* 23.6*   MC monitoring of respiratory status, wean o2 for sats >89%  · Encouraged IS use, deep breath, cough  · Mobilize as able  · Chest tube removed today 3/11 as per thoracic surgery  · Continue pain control  · Follow fluid balance, lytes, urine output  · PPX: LMWH

## 2021-03-11 NOTE — OCCUPATIONAL THERAPY NOTE
OCCUPATIONAL THERAPY QUICK EVALUATION - INPATIENT    Room Number: 6969/2878-G  Evaluation Date: 3/11/2021     Type of Evaluation: Quick Eval  Presenting Problem: 3/10 R lobectomy, mass resection    Physician Order: IP Consult to Occupational Therapy  Felix MOTION AND STRENGTH ASSESSMENT  Upper extremity ROM is within functional limits     Upper extremity strength is within functional limits     NEUROLOGICAL FINDINGS                   ACTIVITY TOLERANCE                         O2 SATURATIONS                AC assistance for household tasks. Pt feels that she has strong support system.     Patient Complexity  Occupational Profile/Medical History  LOW - Brief history including review of medical or therapy records    Specific performance deficits impacting engagem

## 2021-03-11 NOTE — PAYOR COMM NOTE
--------------  CONTINUED STAY REVIEW    Baldev Bustamante PPO  Subscriber #:  [id number]  Authorization Number: [id number]          3/11 PULM    Chest tube removed earlier today      PLAN  · Continue close monitoring of respiratory status, wean o2 for sats >89% (ANCEF/KEFZOL) 2 GM/20ML premix IV syringe 2 g     Date Action Dose Route User    3/11/2021 0044 Given 2 g Intravenous [place] MARLYS Jorge    3/10/2021 1608 Given 2 g Intravenous Lauren Garza RN      cyclobenzaprine (FLEXERIL) tab 10 mg     Date Acti

## 2021-03-11 NOTE — PROGRESS NOTES
[facility]  Progress Note    [address] [facility] Patient Status:  Inpatient    1980 MRN [id number]   [facility] 6NE-A Attending Yessi Nguyen., Myra Boudreaux MD   Hosp Day # 1 PCP No primary care provider on file.      CC: Medical managemen 03/11/2021    K 3.8 03/11/2021     03/11/2021    CO2 25.0 03/11/2021     03/11/2021    CA 9.1 03/11/2021       Imaging:  Imaging reviewed in Epic.     Meds:   cyclobenzaprine (FLEXERIL) tab 10 mg, 10 mg, Oral, TID PRN  influenza vaccine split q 3 mL, Nebulization, Q4H PRN        ASSESSMENT / PLAN:     Status post flexible bronchoscopy, right video assisted thoracoscopic; multiple biopsy of right lung and medistinal mass done by CT surgery Dr. Crystal Kang by CV surgeon.   Pain managemen

## 2021-03-11 NOTE — PLAN OF CARE
Received from [place] [address] around [address] [place] [address]. Weaned to RA. SR on monitors. SBP maintained >90. Art line positional-removed. Wilder with adequate UOP. Right chest tube with intermittent air-leak and serosanguinous drainage noted. Up to chair for dinner time.  Pain well co

## 2021-03-11 NOTE — PLAN OF CARE
Assumed care of this patient at 0730. Drowsy, yet oriented x 4. Room air, no JANNETTE/SOB. Afebrile. Sinus bradycardia-NSR on the monitor. SBP WNL. Right chest tube incision CDI, chest tube pulled by [facility] PA. Tolerating diet well. Voids via commode.  Ambulating i

## 2021-03-11 NOTE — PLAN OF CARE
Assumed patient care this PM. Patient alert and oriented and up in chair. Scheduled pain medication given as well as PRN Oxycodone. Patient got back in bed and was in severe pain shortly after. Pain was in R shoulder blade area.  Patient was super tense and

## 2021-03-11 NOTE — PROGRESS NOTES
Thoracic Surgery Progress Note     Tiff Tipton is a 41year old female. MRN [id number]. Admitted 3/10/2021    [address] [facility] EVENTS:     No acute events overnight. 10/10 pain last night, pain improved today. Using IS, but not as often as she should. -Transfer to floor.   -Maintain saturations above 92%.   -Ambulate 3-4 times per day in the halls. Spend majority of day out of bed, in chair. Encouraged cough and IS use 10x hourly.    -Date and time of follow up appointment written in patient's discharg

## 2021-03-12 VITALS
WEIGHT: 170 LBS | BODY MASS INDEX: 27.32 KG/M2 | SYSTOLIC BLOOD PRESSURE: 115 MMHG | HEART RATE: 71 BPM | OXYGEN SATURATION: 98 % | RESPIRATION RATE: 16 BRPM | TEMPERATURE: 98 F | DIASTOLIC BLOOD PRESSURE: 74 MMHG | HEIGHT: 66 IN

## 2021-03-12 PROCEDURE — 99231 SBSQ HOSP IP/OBS SF/LOW 25: CPT | Performed by: INTERNAL MEDICINE

## 2021-03-12 RX ORDER — ACETAMINOPHEN 325 MG/1
650 TABLET ORAL EVERY 4 HOURS
Status: DISCONTINUED | OUTPATIENT
Start: 2021-03-12 | End: 2021-03-12

## 2021-03-12 NOTE — PROGRESS NOTES
[facility]  Progress Note    [address] [facility] Patient Status:  Inpatient    1980 MRN [id number]   [facility] 6NE-A Attending Semaj Corbin., Ursula Lorenzo MD   Hosp Day # 2 PCP No primary care provider on file.      CC: Medical managemen inj 0.5ml, 0.5 mL, Intramuscular, Prior to discharge  Labetalol HCl (NORMODYNE) tab 100 mg, 100 mg, Oral, BID  Normal Saline Flush 0.9 % injection 10 mL, 10 mL, Intravenous, PRN  Enoxaparin Sodium (LOVENOX) 40 MG/0.4ML injection 40 mg, 40 mg, Subcutaneous, to be discharge to: Home      Questions/concerns and Plan of care were discussed with patient and RN    Plan to discharge by CV surgeon and pulmonary today.   Follow-up with CV surgeon as directed and with regular outpatient primary care physician/TCC clini

## 2021-03-12 NOTE — PROGRESS NOTES
[facility] Pulmonary/Critical Care Progress Note     SUBJECTIVE/Interval history: All events, procedures, notes reviewed. Pt feels well. Chest tubes removed yesterday. Pain controlled. She wishes to go home and sleep.  Bailee Arndt 138 135*   K 4.1 3.8    106   CO2 28.0 25.0     Recent Labs   Lab 03/08/21  0935 03/11/21  0434   RBC 4.25 3.69*   HGB 10.0* 8.7*   HCT 32.5* 27.9*   MCV 76.5* 75.6*   MCH 23.5* 23.6*   MCHC 30.8* 31.2   RDW 15.2* 15.4*   NEPRELIM  --  8.79*   WBC 10 breath, cough  · Mobilize as able  · Chest tube removed today 3/11 as per thoracic surgery  · Continue pain control  · F/w pcp re.  Anemia and check Fe stores  · Follow fluid balance, lytes, urine output  · PPX: LMWH, ancef  · Dispo: stable for discharge fr

## 2021-03-12 NOTE — PROGRESS NOTES
Thoracic Surgery Progress Note     Perdue Adjutant is a 41year old female. MRN [id number]. Admitted 3/10/2021    [address] [facility] EVENTS:     Ambulated today and yesterday. Occasionally gets anxious with ambulating.    Pain better controlled today, no mus standpoint once cleared with other services, pain controlled, and ambulating without issues.     Jose Thomas PA-C  Thoracic Surgery  Pager: [phone]

## 2021-03-12 NOTE — PLAN OF CARE
Patient transferred to floor at shift change, assumed care of pt at [facility]. Pt A&O x4. SPO2 maintained on RA, no c/o SOB. IS best effort 1250, education reinforced, continued use encourages.  Right VATS POD 2, right flank CT site approximated with sutures, cd pain goal  Description: INTERVENTIONS:  - Encourage pt to monitor pain and request assistance  - Assess pain using appropriate pain scale  - Administer analgesics based on type and severity of pain and evaluate response  - Implement non-pharmacological holland interpreters to assist at discharge as needed  - Consider post-discharge preferences of patient/family/discharge partner  - Complete POLST form as appropriate  - Assess patient's ability to be responsible for managing their own health  - Refer to [facility] FOR REHAB MEDICINE

## 2021-03-15 ENCOUNTER — PATIENT OUTREACH (OUTPATIENT)
Dept: CASE MANAGEMENT | Age: 41
End: 2021-03-15

## 2021-03-15 DIAGNOSIS — J18.1 CONSOLIDATION OF RIGHT LOWER LOBE OF LUNG (HCC): ICD-10-CM

## 2021-03-15 DIAGNOSIS — Z02.9 ENCOUNTERS FOR UNSPECIFIED ADMINISTRATIVE PURPOSE: ICD-10-CM

## 2021-03-15 NOTE — PROGRESS NOTES
Initial Post Discharge Follow Up   Discharge Date: 3/12/21  Contact Date: 3/15/2021    Consent Verification:  Assessment Completed With: Patient  HIPAA Verified?   Yes    Discharge Dx:     Status post flexible bronchoscopy, right video assisted thoracosc instructions? No; NCM did review post-op directions in detail including wound care, ROM/deep breathing exercises, and BP parameters (hold Rx if systolic <244 and/or P <56). • Before leaving the hospital was your diagnoses explained to you?  Yes  • Do you that keep you from taking your medication as prescribed? No  Are you having any concerns with constipation? Yes; The patient has not yet had a bowel movement since discharge.  NCM did stress the importance of pushing fluids, a high fiber diet, OTC stool sof NCM:  Reviewed all discharge instructions with the patient with a focus on post-op directions, wound care, BP/constipation management, deep breathing exercises, and fall precautions.  All medications were reviewed and educated on the importance of taking th

## 2021-03-15 NOTE — PAYOR COMM NOTE
--------------  DISCHARGE REVIEW    Gabylilibeth [place] [facility]  Subscriber #:  [id number]  Authorization Number: [id number]    Admit date: 3/10/21  Admit time:   6:10 AM  Discharge Date: 3/12/2021  6:34 PM     Admitting Physician: Nataly Santos MD  Attending

## 2021-03-15 NOTE — DISCHARGE SUMMARY
[facility]    Discharge Summary    [address] [facility] Patient Status:  Inpatient    1980 MRN [id number]   [facility] 8NE-A Attending No att. providers found   [address] Day # 2 PCP No primary care provider on file.      Date of Admissi the patient was sent home in good condition.     Consultations: Pulmonology, Internal Medicine    Procedures: right VATS lung biopsy, mediastinal biopsy, chest wall biopsy    Complications: none    Discharge Condition: Good    Discharge Medications:      David Parra

## 2021-03-17 NOTE — PROGRESS NOTES
[address] [facility] CLINIC      HISTORY   CHIEF COMPLAINT: RUL nodule/mediastinal mass s/p right VATS w/ multiple bxs  HPI: Rossi Fisher is a 41year old female here today for hospital follow up for RUL nodule/mediastinal ma (MULTIVITAMIN OR), Take 1 tablet by mouth daily. , Disp: , Rfl:     No current facility-administered medications for this visit.       HISTORY: reconciled and reviewed with patient  Past Medical History:   Diagnosis Date   • High blood pressure    • History lesions  EYES: denies blurred vision or double vision, eye pain, vision loss  HEENT: denies ear pain, loss of hearing, epistaxis, loss of smell, sinus pain, change in voice, difficulty swallowing, dry mouth, sore throat, nasal congestion, sinus pain   RESP edema  NEURO: CN II-XII intact, motor and sensory are grossly intact, oriented x3  PSYCHIATRIC: appropriate affect    ASSESSMENT/ PLAN:   1. RUL nodule/mediastinal mass s/p right VATS w/ multiple bxs  · Counseled on smoking cessation  · IS  · Right arm exe and activities of daily living. ? Referrals as listed below in orders Assisted in scheduling required follow-up with community providers and services. Medication Reconciliation:  I am aware of an inpatient discharge within the last 30 days.   The discha

## 2021-03-18 ENCOUNTER — OFFICE VISIT (OUTPATIENT)
Dept: INTERNAL MEDICINE CLINIC | Facility: CLINIC | Age: 41
End: 2021-03-18
Payer: COMMERCIAL

## 2021-03-18 VITALS
DIASTOLIC BLOOD PRESSURE: 74 MMHG | SYSTOLIC BLOOD PRESSURE: 112 MMHG | HEIGHT: 66 IN | HEART RATE: 108 BPM | RESPIRATION RATE: 18 BRPM | OXYGEN SATURATION: 98 % | TEMPERATURE: 97 F | BODY MASS INDEX: 26.36 KG/M2 | WEIGHT: 164 LBS

## 2021-03-18 DIAGNOSIS — J18.1 CONSOLIDATION OF RIGHT LOWER LOBE OF LUNG (HCC): Primary | ICD-10-CM

## 2021-03-18 DIAGNOSIS — K59.01 SLOW TRANSIT CONSTIPATION: ICD-10-CM

## 2021-03-18 PROCEDURE — 3008F BODY MASS INDEX DOCD: CPT | Performed by: NURSE PRACTITIONER

## 2021-03-18 PROCEDURE — 3078F DIAST BP <80 MM HG: CPT | Performed by: NURSE PRACTITIONER

## 2021-03-18 PROCEDURE — 3074F SYST BP LT 130 MM HG: CPT | Performed by: NURSE PRACTITIONER

## 2021-03-18 PROCEDURE — 99495 TRANSJ CARE MGMT MOD F2F 14D: CPT | Performed by: NURSE PRACTITIONER

## 2021-03-18 RX ORDER — DOCUSATE SODIUM 100 MG/1
100 CAPSULE, LIQUID FILLED ORAL 2 TIMES DAILY PRN
COMMUNITY
End: 2021-11-04

## 2021-03-18 NOTE — PATIENT INSTRUCTIONS
Patient instructions:  · Continue to monitor blood pressure and pulse.   If top number of blood pressure is <110 or pulse is <60, do not take labetalol  · Continue to use incentive spirometer/breathing device  · Do right arm exercises 2-3 times daily  · Yakov

## 2021-03-18 NOTE — PROGRESS NOTES
TRANSITIONAL CARE CLINIC PHARMACIST MEDICATION RECONCILIATION        Saul Rodriguez MRN [id number]    1980 PCP No primary care provider on file.        Comments: Medication history completed by the [facility] Pharmacist with the stas night. Patient reports she has not had a good bowel movement since before hospitalization. States she has had some small ones. Reports taking the Miralax daily and 2 days ago started to take Docusate Sodium without any success. APN to assess further.

## 2021-03-24 ENCOUNTER — OFFICE VISIT (OUTPATIENT)
Dept: HEMATOLOGY/ONCOLOGY | Facility: HOSPITAL | Age: 41
End: 2021-03-24
Attending: THORACIC SURGERY (CARDIOTHORACIC VASCULAR SURGERY)
Payer: COMMERCIAL

## 2021-03-24 VITALS
WEIGHT: 165 LBS | OXYGEN SATURATION: 97 % | RESPIRATION RATE: 16 BRPM | TEMPERATURE: 97 F | HEIGHT: 65.98 IN | HEART RATE: 112 BPM | DIASTOLIC BLOOD PRESSURE: 85 MMHG | SYSTOLIC BLOOD PRESSURE: 124 MMHG | BODY MASS INDEX: 26.52 KG/M2

## 2021-04-27 NOTE — PROGRESS NOTES
Ms. Dilcia Lawler is a 41year old female who presents today in follow up after a right VATS biopsy of mediastinal mass and lung consolidation. She Is doing well. She complains of some post operative pain and incisional tederness. She denies fevers or chills.  No female in no acute distress  HEENT: Normocephalic, PERRL, EOMI  Neck: Supple, trachea midline, no JVD, no masses. Thyroid not grossly enlarged  Nodes: no cervical or supraclavicular lymphadenopathy appreciated  Heart: regular rate and rhythm.  No murmurs, r

## 2021-11-12 ENCOUNTER — LAB ENCOUNTER (OUTPATIENT)
Dept: LAB | Facility: HOSPITAL | Age: 41
End: 2021-11-12
Attending: THORACIC SURGERY (CARDIOTHORACIC VASCULAR SURGERY)
Payer: COMMERCIAL

## 2021-11-12 DIAGNOSIS — C96.6 LANGERHAN'S CELL HISTIOCYTOSIS (HCC): Primary | ICD-10-CM

## 2021-11-12 PROCEDURE — 88381 MICRODISSECTION MANUAL: CPT

## 2021-11-12 PROCEDURE — 81210 BRAF GENE: CPT

## 2025-03-12 NOTE — PLAN OF CARE
Assumed patient care this PM. Patient alert and oriented x4, SUAREZ. Patient's pain has been better controlled tonight and was able to get some sleep. Patient ambulating to the bathroom with no issues. VSS.  Transfer orders and possibly home today per MD. All Statement Selected

## (undated) DEVICE — SOL H2O 1000ML BTL

## (undated) DEVICE — SUTURE SILK 0 FSL

## (undated) DEVICE — MEDI-VAC NON-CONDUCTIVE SUCTION TUBING: Brand: CARDINAL HEALTH

## (undated) DEVICE — 3M™ RED DOT™ MONITORING ELECTRODE WITH FOAM TAPE AND STICKY GEL, 50/BAG, 20/CASE, 72/PLT 2570: Brand: RED DOT™

## (undated) DEVICE — STERILE POLYISOPRENE POWDER-FREE SURGICAL GLOVES: Brand: PROTEXIS

## (undated) DEVICE — Device

## (undated) DEVICE — SUTURE VICRYL 2-0 CT-1

## (undated) DEVICE — SOLUTION ENDOSCOPIC ANTI-FOG NON-TOXIC NON-ABRASIVE 6 CUBIC CENTIMETER WITH RADIOPAQUE ADHESIVE-BACKED SPONGE STERILE NOT MADE WITH NATURAL RUBBER LATEX MEDICHOICE: Brand: MEDICHOICE

## (undated) DEVICE — ENDOSCOPY PACK UPPER: Brand: MEDLINE INDUSTRIES, INC.

## (undated) DEVICE — 3M™ IOBAN™ 2 ANTIMICROBIAL INCISE DRAPE 6650EZ: Brand: IOBAN™ 2

## (undated) DEVICE — NON-ADHERENT PAD PREPACK: Brand: TELFA

## (undated) DEVICE — MEDI-VAC SUCTION HANDLE REGULAR CAPACITY: Brand: CARDINAL HEALTH

## (undated) DEVICE — SUTURE MONOCRYL 4-0 PS-2

## (undated) DEVICE — STANDARD HYPODERMIC NEEDLE,POLYPROPYLENE HUB: Brand: MONOJECT

## (undated) DEVICE — ARYGLE SUCTION CATHETER WITH CHIMNEY VALVE STRIAGHT PACKED 14 FR/ CH: Brand: ARGYLE

## (undated) DEVICE — NEEDLE ASP VIZISHOT 22GA 1.8MM

## (undated) DEVICE — KENDALL SCD EXPRESS SLEEVES, KNEE LENGTH, MEDIUM: Brand: KENDALL SCD

## (undated) DEVICE — ABSORBABLE HEMOSTAT (OXIDIZED REGENERATED CELLULOSE, U.S.P.): Brand: SURGICEL

## (undated) DEVICE — 1200CC GUARDIAN II: Brand: GUARDIAN

## (undated) DEVICE — FILTERLINE NASAL ADULT O2/CO2

## (undated) DEVICE — BOWLS UTILITY 16OZ

## (undated) DEVICE — WOUND RETRACTOR AND PROTECTOR: Brand: ALEXIS WOUND PROTECTOR-RETRACTOR

## (undated) DEVICE — PATCH SENSOR PATIENT

## (undated) DEVICE — GAUZE SPONGES,USP TYPE VII GAUZE, 12 PLY: Brand: CURITY

## (undated) DEVICE — DRAIN CHEST DRY ADULT/PED

## (undated) DEVICE — SYRINGE 10ML LL TIP

## (undated) DEVICE — GLOVE SURG TRIUMPH SZ 8

## (undated) DEVICE — BLADE ELECTRODE: Brand: EDGE

## (undated) DEVICE — CV PACK-LF: Brand: MEDLINE INDUSTRIES, INC.

## (undated) DEVICE — SKIN AFFIX .4ML

## (undated) DEVICE — HARMONIC ACE +7 LAPAROSCOPIC SHEARS ADVANCED HEMOSTASIS 5MM DIAMETER 36CM SHAFT LENGTH  FOR USE WITH GRAY HAND PIECE ONLY: Brand: HARMONIC ACE

## (undated) NOTE — LETTER
Mica Perea [address] [address]  [place], [address] [facility]    Consent for Operation  Date: __________________                                Time: _______________    1.  I authorize the performance upon alberta the following operation:  Procedu revealed by the pictures or by descriptive texts accompanying them. If the procedure has been videotaped, the surgeon will obtain the original videotape. The hospital will not be responsible for storage or maintenance of this tape.   7. For the purpose of a THAT MY DOCTOR PROVIDED ME WITH THE ABOVE EXPLANATIONS, THAT ALL BLANKS OR STATEMENTS REQUIRING INSERTION OR COMPLETION WERE FILLED IN.     Signature of Patient:   ___________________________    When the patient is a minor or mentally incompetent to give co iii. All of the medicines I take (including prescriptions, herbal supplements, and pills I can buy without a prescription (including street drugs/illegal medications).  Failure to inform my anesthesiologist about these medicines may increase my risk of anes _____________________________________________________________________________  Anesthesiologist Signature     Date   Time  I have discussed the procedure and information above with the patient (or patient’s representative) and answered their questions.  The

## (undated) NOTE — LETTER
[address] [facility] FOR BLOOD OR BLOOD COMPONENTS      In the course of your treatment, it may become necessary to administer a transfusion of blood or blood components.  This form provides basic information concerning this proc alternatives to you if it has not already been done. I,Komal Martins, have read/had read to me the above. I understand the matters bearing on the decision whether or not to authorize a transfusion of blood or blood components.  I have no questions whic